# Patient Record
Sex: MALE | Race: WHITE | URBAN - METROPOLITAN AREA
[De-identification: names, ages, dates, MRNs, and addresses within clinical notes are randomized per-mention and may not be internally consistent; named-entity substitution may affect disease eponyms.]

---

## 2022-11-03 ENCOUNTER — OFFICE VISIT (OUTPATIENT)
Dept: URBAN - METROPOLITAN AREA CLINIC 71 | Facility: CLINIC | Age: 77
End: 2022-11-03
Payer: COMMERCIAL

## 2022-11-03 DIAGNOSIS — H52.4 PRESBYOPIA: ICD-10-CM

## 2022-11-03 DIAGNOSIS — H25.812 COMBINED FORMS OF AGE-RELATED CATARACT, LEFT EYE: Primary | ICD-10-CM

## 2022-11-03 DIAGNOSIS — H43.811 VITREOUS DEGENERATION, RIGHT EYE: ICD-10-CM

## 2022-11-03 DIAGNOSIS — Z96.1 PRESENCE OF INTRAOCULAR LENS: ICD-10-CM

## 2022-11-03 PROCEDURE — 92004 COMPRE OPH EXAM NEW PT 1/>: CPT | Performed by: OPTOMETRIST

## 2022-11-03 ASSESSMENT — KERATOMETRY
OD: 41.00
OS: 41.75

## 2022-11-03 ASSESSMENT — VISUAL ACUITY
OD: 20/20
OS: 20/20

## 2022-11-03 ASSESSMENT — INTRAOCULAR PRESSURE
OS: 9
OD: 9

## 2022-11-03 NOTE — IMPRESSION/PLAN
Impression: Presbyopia: H52.4. Assessed by Dr. Cedrick Wang today. Plan: A glasses prescription has been discussed and generated. Patient to call with any concerns.